# Patient Record
Sex: MALE | Race: WHITE | NOT HISPANIC OR LATINO | Employment: OTHER | URBAN - NONMETROPOLITAN AREA
[De-identification: names, ages, dates, MRNs, and addresses within clinical notes are randomized per-mention and may not be internally consistent; named-entity substitution may affect disease eponyms.]

---

## 2017-08-27 ENCOUNTER — APPOINTMENT (OUTPATIENT)
Dept: CT IMAGING | Facility: HOSPITAL | Age: 57
End: 2017-08-27

## 2017-08-27 ENCOUNTER — APPOINTMENT (OUTPATIENT)
Dept: GENERAL RADIOLOGY | Facility: HOSPITAL | Age: 57
End: 2017-08-27

## 2017-08-27 ENCOUNTER — HOSPITAL ENCOUNTER (EMERGENCY)
Facility: HOSPITAL | Age: 57
Discharge: HOME | End: 2017-08-27
Attending: FAMILY MEDICINE | Admitting: FAMILY MEDICINE

## 2017-08-27 VITALS
HEART RATE: 89 BPM | SYSTOLIC BLOOD PRESSURE: 142 MMHG | HEIGHT: 69 IN | RESPIRATION RATE: 16 BRPM | TEMPERATURE: 98.1 F | DIASTOLIC BLOOD PRESSURE: 92 MMHG | BODY MASS INDEX: 27.55 KG/M2 | WEIGHT: 186 LBS | OXYGEN SATURATION: 99 %

## 2017-08-27 DIAGNOSIS — R55 SYNCOPE, UNSPECIFIED SYNCOPE TYPE: Primary | ICD-10-CM

## 2017-08-27 LAB
A-A DO2: 37.8 MMHG (ref 0–300)
ALBUMIN SERPL-MCNC: 4.6 G/DL (ref 3.5–5)
ALBUMIN/GLOB SERPL: 1.6 G/DL (ref 1.5–2.5)
ALP SERPL-CCNC: 107 U/L (ref 40–129)
ALT SERPL W P-5'-P-CCNC: 59 U/L (ref 10–44)
ANION GAP SERPL CALCULATED.3IONS-SCNC: 7 MMOL/L (ref 3.6–11.2)
APTT PPP: 23.7 SECONDS (ref 23.8–36.1)
ARTERIAL PATENCY WRIST A: POSITIVE
AST SERPL-CCNC: 39 U/L (ref 10–34)
ATMOSPHERIC PRESS: 731 MMHG
BASE EXCESS BLDA CALC-SCNC: -4.6 MMOL/L
BASOPHILS # BLD AUTO: 0.01 10*3/MM3 (ref 0–0.3)
BASOPHILS NFR BLD AUTO: 0.1 % (ref 0–2)
BDY SITE: ABNORMAL
BILIRUB SERPL-MCNC: 0.5 MG/DL (ref 0.2–1.8)
BILIRUB UR QL STRIP: NEGATIVE
BNP SERPL-MCNC: 5 PG/ML (ref 0–100)
BODY TEMPERATURE: 98.6 C
BUN BLD-MCNC: 18 MG/DL (ref 7–21)
BUN/CREAT SERPL: 16.5 (ref 7–25)
CALCIUM SPEC-SCNC: 9.8 MG/DL (ref 7.7–10)
CHLORIDE SERPL-SCNC: 105 MMOL/L (ref 99–112)
CK MB SERPL-CCNC: 0.86 NG/ML (ref 0–5)
CK MB SERPL-RTO: 0.7 % (ref 0–3)
CK SERPL-CCNC: 126 U/L (ref 24–204)
CLARITY UR: CLEAR
CO2 SERPL-SCNC: 26 MMOL/L (ref 24.3–31.9)
COHGB MFR BLD: 1.4 % (ref 0–5)
COLOR UR: YELLOW
CREAT BLD-MCNC: 1.09 MG/DL (ref 0.43–1.29)
CRP SERPL-MCNC: <0.5 MG/DL (ref 0–0.99)
D-LACTATE SERPL-SCNC: 2.1 MMOL/L (ref 0.5–2)
DEPRECATED RDW RBC AUTO: 44.4 FL (ref 37–54)
EOSINOPHIL # BLD AUTO: 0.1 10*3/MM3 (ref 0–0.7)
EOSINOPHIL NFR BLD AUTO: 1.2 % (ref 0–5)
ERYTHROCYTE [DISTWIDTH] IN BLOOD BY AUTOMATED COUNT: 14 % (ref 11.5–14.5)
GFR SERPL CREATININE-BSD FRML MDRD: 69 ML/MIN/1.73
GLOBULIN UR ELPH-MCNC: 2.8 GM/DL
GLUCOSE BLD-MCNC: 129 MG/DL (ref 70–110)
GLUCOSE BLDC GLUCOMTR-MCNC: 106 MG/DL (ref 70–130)
GLUCOSE UR STRIP-MCNC: NEGATIVE MG/DL
HCO3 BLDA-SCNC: 18.2 MMOL/L (ref 22–26)
HCT VFR BLD AUTO: 43.5 % (ref 42–52)
HCT VFR BLD CALC: 44 % (ref 42–52)
HGB BLD-MCNC: 14.6 G/DL (ref 14–18)
HGB BLDA-MCNC: 15 G/DL (ref 12–16)
HGB UR QL STRIP.AUTO: NEGATIVE
HOROWITZ INDEX BLD+IHG-RTO: 21 %
IMM GRANULOCYTES # BLD: 0.01 10*3/MM3 (ref 0–0.03)
IMM GRANULOCYTES NFR BLD: 0.1 % (ref 0–0.5)
INR PPP: 0.94 (ref 0.9–1.1)
KETONES UR QL STRIP: NEGATIVE
LEUKOCYTE ESTERASE UR QL STRIP.AUTO: NEGATIVE
LYMPHOCYTES # BLD AUTO: 1.13 10*3/MM3 (ref 1–3)
LYMPHOCYTES NFR BLD AUTO: 13.4 % (ref 21–51)
MCH RBC QN AUTO: 29.3 PG (ref 27–33)
MCHC RBC AUTO-ENTMCNC: 33.6 G/DL (ref 33–37)
MCV RBC AUTO: 87.3 FL (ref 80–94)
METHGB BLD QL: 0.3 % (ref 0–3)
MODALITY: ABNORMAL
MONOCYTES # BLD AUTO: 0.75 10*3/MM3 (ref 0.1–0.9)
MONOCYTES NFR BLD AUTO: 8.9 % (ref 0–10)
NEUTROPHILS # BLD AUTO: 6.43 10*3/MM3 (ref 1.4–6.5)
NEUTROPHILS NFR BLD AUTO: 76.3 % (ref 30–70)
NITRITE UR QL STRIP: NEGATIVE
OSMOLALITY SERPL CALC.SUM OF ELEC: 279.3 MOSM/KG (ref 273–305)
OXYHGB MFR BLDV: 92.7 % (ref 85–100)
PCO2 BLDA: 28.2 MM HG (ref 35–45)
PH BLDA: 7.43 PH UNITS (ref 7.35–7.45)
PH UR STRIP.AUTO: 6 [PH] (ref 5–8)
PLATELET # BLD AUTO: 193 10*3/MM3 (ref 130–400)
PMV BLD AUTO: 10 FL (ref 6–10)
PO2 BLDA: 72.1 MM HG (ref 80–100)
POTASSIUM BLD-SCNC: 4.1 MMOL/L (ref 3.5–5.3)
PROT SERPL-MCNC: 7.4 G/DL (ref 6–8)
PROT UR QL STRIP: NEGATIVE
PROTHROMBIN TIME: 12.6 SECONDS (ref 11–15.4)
RBC # BLD AUTO: 4.98 10*6/MM3 (ref 4.7–6.1)
SAO2 % BLDCOA: 94.3 % (ref 90–100)
SODIUM BLD-SCNC: 138 MMOL/L (ref 135–153)
SP GR UR STRIP: 1.01 (ref 1–1.03)
TROPONIN I SERPL-MCNC: <0.006 NG/ML
TROPONIN I SERPL-MCNC: <0.006 NG/ML
UROBILINOGEN UR QL STRIP: NORMAL
WBC NRBC COR # BLD: 8.43 10*3/MM3 (ref 4.5–12.5)

## 2017-08-27 PROCEDURE — 84484 ASSAY OF TROPONIN QUANT: CPT | Performed by: FAMILY MEDICINE

## 2017-08-27 PROCEDURE — 85025 COMPLETE CBC W/AUTO DIFF WBC: CPT | Performed by: FAMILY MEDICINE

## 2017-08-27 PROCEDURE — 85730 THROMBOPLASTIN TIME PARTIAL: CPT | Performed by: FAMILY MEDICINE

## 2017-08-27 PROCEDURE — 70450 CT HEAD/BRAIN W/O DYE: CPT | Performed by: RADIOLOGY

## 2017-08-27 PROCEDURE — 82375 ASSAY CARBOXYHB QUANT: CPT | Performed by: FAMILY MEDICINE

## 2017-08-27 PROCEDURE — 83605 ASSAY OF LACTIC ACID: CPT | Performed by: FAMILY MEDICINE

## 2017-08-27 PROCEDURE — 82553 CREATINE MB FRACTION: CPT | Performed by: FAMILY MEDICINE

## 2017-08-27 PROCEDURE — 86140 C-REACTIVE PROTEIN: CPT | Performed by: FAMILY MEDICINE

## 2017-08-27 PROCEDURE — 96360 HYDRATION IV INFUSION INIT: CPT

## 2017-08-27 PROCEDURE — 70450 CT HEAD/BRAIN W/O DYE: CPT

## 2017-08-27 PROCEDURE — 80053 COMPREHEN METABOLIC PANEL: CPT | Performed by: FAMILY MEDICINE

## 2017-08-27 PROCEDURE — 93005 ELECTROCARDIOGRAM TRACING: CPT | Performed by: FAMILY MEDICINE

## 2017-08-27 PROCEDURE — 93010 ELECTROCARDIOGRAM REPORT: CPT | Performed by: INTERNAL MEDICINE

## 2017-08-27 PROCEDURE — 82962 GLUCOSE BLOOD TEST: CPT

## 2017-08-27 PROCEDURE — 99284 EMERGENCY DEPT VISIT MOD MDM: CPT

## 2017-08-27 PROCEDURE — 87040 BLOOD CULTURE FOR BACTERIA: CPT | Performed by: FAMILY MEDICINE

## 2017-08-27 PROCEDURE — 85610 PROTHROMBIN TIME: CPT | Performed by: FAMILY MEDICINE

## 2017-08-27 PROCEDURE — 71010 XR CHEST 1 VW: CPT | Performed by: RADIOLOGY

## 2017-08-27 PROCEDURE — 87086 URINE CULTURE/COLONY COUNT: CPT | Performed by: FAMILY MEDICINE

## 2017-08-27 PROCEDURE — 71010 HC CHEST PA OR AP: CPT

## 2017-08-27 PROCEDURE — 82550 ASSAY OF CK (CPK): CPT | Performed by: FAMILY MEDICINE

## 2017-08-27 PROCEDURE — 81003 URINALYSIS AUTO W/O SCOPE: CPT | Performed by: FAMILY MEDICINE

## 2017-08-27 PROCEDURE — 36600 WITHDRAWAL OF ARTERIAL BLOOD: CPT | Performed by: FAMILY MEDICINE

## 2017-08-27 PROCEDURE — 82805 BLOOD GASES W/O2 SATURATION: CPT | Performed by: FAMILY MEDICINE

## 2017-08-27 PROCEDURE — 83880 ASSAY OF NATRIURETIC PEPTIDE: CPT | Performed by: FAMILY MEDICINE

## 2017-08-27 PROCEDURE — 83050 HGB METHEMOGLOBIN QUAN: CPT | Performed by: FAMILY MEDICINE

## 2017-08-27 RX ORDER — ATORVASTATIN CALCIUM 40 MG/1
40 TABLET, FILM COATED ORAL DAILY
COMMUNITY
End: 2017-10-12

## 2017-08-27 RX ORDER — AMLODIPINE BESYLATE 5 MG/1
5 TABLET ORAL DAILY
COMMUNITY
End: 2017-09-05 | Stop reason: ALTCHOICE

## 2017-08-27 RX ORDER — ASPIRIN 81 MG/1
324 TABLET, CHEWABLE ORAL ONCE
Status: COMPLETED | OUTPATIENT
Start: 2017-08-27 | End: 2017-08-27

## 2017-08-27 RX ORDER — SODIUM CHLORIDE 0.9 % (FLUSH) 0.9 %
10 SYRINGE (ML) INJECTION AS NEEDED
Status: DISCONTINUED | OUTPATIENT
Start: 2017-08-27 | End: 2017-08-27 | Stop reason: HOSPADM

## 2017-08-27 RX ORDER — MELOXICAM 15 MG/1
15 TABLET ORAL DAILY
COMMUNITY

## 2017-08-27 RX ADMIN — ASPIRIN 324 MG: 81 TABLET, CHEWABLE ORAL at 11:40

## 2017-08-27 RX ADMIN — SODIUM CHLORIDE 1000 ML: 9 INJECTION, SOLUTION INTRAVENOUS at 11:46

## 2017-08-27 NOTE — ED NOTES
Patient sleeping on stretcher at this time; NAD noted; no complaints; will continue to monitor patient for any changes.     Carly Almodovar RN  08/27/17 9709

## 2017-08-27 NOTE — ED NOTES
No changes in patient status; NAD noted; will continue to monitor patient for any changes. Neuro's WNL.     Carly Almodovar RN  08/27/17 2320

## 2017-08-27 NOTE — ED NOTES
Patient reports syncopal episode while at [employer]; [employer] member reports that patient passed out and was unresponsive and became gray in color and diaphoretic and stiffened up; Patient alert and oriented x 4; reports that he doesn't remember anything that has happened; skin now pink, warm and dry; denies any chest pain or discomfort, denies any shortness of breath, denies any n/v. Patient does report that he has had diarrhea x 3-4 times this am.       Carly Almodovar RN  08/27/17 1310

## 2017-08-27 NOTE — ED PROVIDER NOTES
Subjective   Patient is a 57 y.o. male presenting with syncope.   History provided by:  Patient  Syncope   Episode history:  Single  Most recent episode:  Today  Timing:  Constant  Progression:  Resolved  Chronicity:  New  Context: normal activity and standing up    Witnessed: yes    Relieved by:  Nothing  Worsened by:  Nothing  Associated symptoms: diaphoresis    Associated symptoms: no anxiety, no chest pain, no confusion, no dizziness, no headaches, no nausea, no shortness of breath, no vomiting and no weakness    Associated symptoms comment:  Diarrhea this am and then after the event      Review of Systems   Constitutional: Positive for diaphoresis. Negative for activity change, appetite change, chills and fatigue.   HENT: Negative for congestion.    Eyes: Negative for pain.   Respiratory: Negative for cough, shortness of breath, wheezing and stridor.    Cardiovascular: Positive for syncope. Negative for chest pain.   Gastrointestinal: Negative for abdominal pain, diarrhea, nausea and vomiting.   Genitourinary: Negative for dysuria.   Musculoskeletal: Negative for arthralgias, myalgias, neck pain and neck stiffness.   Skin: Negative for rash.   Neurological: Negative for dizziness, syncope, speech difficulty, weakness and headaches.   Psychiatric/Behavioral: Negative for agitation and confusion.       Past Medical History:   Diagnosis Date   • Arthritis    • Hyperlipidemia    • Hypertension        Allergies   Allergen Reactions   • Lisinopril    • Sulfa Antibiotics        History reviewed. No pertinent surgical history.    History reviewed. No pertinent family history.    Social History     Social History   • Marital status:      Spouse name: N/A   • Number of children: N/A   • Years of education: N/A     Social History Main Topics   • Smoking status: Former Smoker     Quit date: 1987   • Smokeless tobacco: None   • Alcohol use No   • Drug use: No   • Sexual activity: Defer     Other Topics Concern   •  None     Social History Narrative   • None           Objective   Physical Exam   Constitutional: He is oriented to person, place, and time. He appears well-developed and well-nourished. No distress.   HENT:   Head: Normocephalic and atraumatic.   Right Ear: External ear normal.   Left Ear: External ear normal.   Nose: Nose normal.   Mouth/Throat: Oropharynx is clear and moist.   Eyes: EOM are normal. Pupils are equal, round, and reactive to light. Right eye exhibits no discharge. Left eye exhibits no discharge.   Neck: Neck supple. No JVD present. No tracheal deviation present. No thyromegaly present.   Pulmonary/Chest: Effort normal and breath sounds normal. No stridor. No respiratory distress.   Abdominal: Soft. Bowel sounds are normal. He exhibits no distension. There is no tenderness. There is no rebound and no guarding.   Musculoskeletal: Normal range of motion. He exhibits no edema, tenderness or deformity.   Neurological: He is alert and oriented to person, place, and time. He displays normal reflexes. No cranial nerve deficit. He exhibits normal muscle tone. Coordination normal.   Skin: Skin is warm. He is not diaphoretic.   Psychiatric: He has a normal mood and affect. His behavior is normal. Judgment and thought content normal.   Nursing note and vitals reviewed.      Procedures         ED Course  ED Course   Value Comment By Time    CT head- NOrmal brain/head CT per [facility] Melody Hanley, DO 08/27 1212   ECG 12 Lead EKG interpretation at 1141 normal sinus rhythm 74 bpm no evidence of acute ischemic change QRS duration is 106 QTc is 374 QTc is 415 Melody Hanley, DO 08/27 1215    Dustin called from lab to report to me a lactic acid of 2.1 Melody Hanley, DO 08/27 1224                  MDM  Number of Diagnoses or Management Options  Syncope, unspecified syncope type: new and requires workup     Amount and/or Complexity of Data Reviewed  Clinical lab tests: ordered and reviewed  Tests in  the radiology section of CPT®: ordered and reviewed  Tests in the medicine section of CPT®: reviewed and ordered  Independent visualization of images, tracings, or specimens: yes    Risk of Complications, Morbidity, and/or Mortality  Presenting problems: moderate  Diagnostic procedures: moderate  Management options: moderate    Patient Progress  Patient progress: improved      Final diagnoses:   Syncope, unspecified syncope type            Melody Hanley DO  08/28/17 0654

## 2017-08-29 LAB — BACTERIA SPEC AEROBE CULT: NO GROWTH

## 2017-09-01 LAB
BACTERIA SPEC AEROBE CULT: NORMAL
BACTERIA SPEC AEROBE CULT: NORMAL

## 2017-09-05 ENCOUNTER — OFFICE VISIT (OUTPATIENT)
Dept: CARDIOLOGY | Facility: CLINIC | Age: 57
End: 2017-09-05

## 2017-09-05 VITALS
BODY MASS INDEX: 27.67 KG/M2 | DIASTOLIC BLOOD PRESSURE: 93 MMHG | OXYGEN SATURATION: 97 % | SYSTOLIC BLOOD PRESSURE: 151 MMHG | HEART RATE: 81 BPM | WEIGHT: 186.8 LBS | HEIGHT: 69 IN

## 2017-09-05 DIAGNOSIS — I10 ESSENTIAL HYPERTENSION: ICD-10-CM

## 2017-09-05 DIAGNOSIS — R55 SYNCOPE, UNSPECIFIED SYNCOPE TYPE: ICD-10-CM

## 2017-09-05 DIAGNOSIS — R06.02 SHORTNESS OF BREATH: Primary | ICD-10-CM

## 2017-09-05 PROBLEM — R07.9 CHEST PAIN: Status: ACTIVE | Noted: 2017-09-05

## 2017-09-05 PROCEDURE — 99204 OFFICE O/P NEW MOD 45 MIN: CPT | Performed by: INTERNAL MEDICINE

## 2017-09-05 RX ORDER — ATENOLOL 25 MG/1
25 TABLET ORAL DAILY
Qty: 30 TABLET | Refills: 11 | Status: SHIPPED | OUTPATIENT
Start: 2017-09-05 | End: 2017-09-25 | Stop reason: SINTOL

## 2017-09-05 NOTE — PATIENT INSTRUCTIONS
Monitor heart rate and blood pressure when convenient for you or with symptoms. Keep a lod of readings and bring to next apt. Call our office and ask for Lizzie with questions or concerns.

## 2017-09-05 NOTE — PROGRESS NOTES
"Subjective   Ric Wilson is a 57 y.o. male     Chief Complaint   Patient presents with   • Loss of Consciousness     patient presents as a new patient       PROBLEM LIST:     Specialty Problems     None            HPI:    Patient is a 57-year-old male that presents because of a syncopal episode.  Patient states that he was at [employer] in a prayer  cervical.  Patient felt as if he needed to relax his knees before having a syncopal episode.  He described having this episode while standing.  According to personnel around him he was \"stiff\".  After 2-3 minutes he regained consciousness.  He had no loss of bowel or bladder function.  He had no chest pain, dyspnea or palpitations.  This is only one isolated event of syncope.  He has had no symptoms since.  He is active doing carpentry work.  He has only very minimal dyspnea when exerting for high levels of activity.  He has no chest pain or pressure.  He denies PND orthopnea.  He doesn't palpitate or have dysrhythmic symptoms otherwise      CURRENT MEDICATION:    Current Outpatient Prescriptions   Medication Sig Dispense Refill   • amLODIPine (NORVASC) 5 MG tablet Take 5 mg by mouth Daily.     • atorvastatin (LIPITOR) 40 MG tablet Take 40 mg by mouth Daily.     • meloxicam (MOBIC) 15 MG tablet Take 15 mg by mouth Daily.       No current facility-administered medications for this visit.        ALLERGIES:    Lisinopril and Sulfa antibiotics    PAST MEDICAL HISTORY:    Past Medical History:   Diagnosis Date   • Arthritis    • Hyperlipidemia    • Hypertension        SURGICAL HISTORY:    History reviewed. No pertinent surgical history.    SOCIAL HISTORY:    Social History     Social History   • Marital status:      Spouse name: N/A   • Number of children: N/A   • Years of education: N/A     Occupational History   • Not on file.     Social History Main Topics   • Smoking status: Former Smoker     Quit date: 1987   • Smokeless tobacco: Not on file   • Alcohol use No   • " "Drug use: No   • Sexual activity: Defer     Other Topics Concern   • Not on file     Social History Narrative       FAMILY HISTORY:    Family History   Problem Relation Age of Onset   • No Known Problems Mother    • No Known Problems Father        Review of Systems   Constitutional: Negative.  Negative for chills, diaphoresis, fatigue and fever.   HENT: Negative.    Eyes: Positive for visual disturbance (wears glasses).   Respiratory: Positive for shortness of breath (with over exertion, no orthopnea or PND). Negative for cough, choking, chest tightness, wheezing and stridor.    Cardiovascular: Negative.  Negative for chest pain, palpitations and leg swelling.   Gastrointestinal: Negative.  Negative for abdominal pain, blood in stool (no melena, no hematuria, no hematemesis, no hematochezia), constipation, diarrhea, nausea and vomiting.   Endocrine: Positive for heat intolerance. Negative for cold intolerance.   Genitourinary: Negative.    Musculoskeletal: Negative.  Negative for myalgias.   Skin: Negative.    Allergic/Immunologic: Negative.    Neurological: Positive for dizziness and syncope (1 episode 1 weeks ago while standing 15-20 minutes in prayer group , developed near syncope, has had other episode since then ). Negative for tremors, seizures, facial asymmetry (no stroke like symptoms), speech difficulty, weakness, light-headedness, numbness and headaches.   Hematological: Negative.    Psychiatric/Behavioral: Negative.        VISIT VITALS:    /93 (BP Location: Right arm, Patient Position: Standing)  Pulse 81  Ht 69\" (175.3 cm)  Wt 186 lb 12.8 oz (84.7 kg)  SpO2 97%  BMI 27.59 kg/m2    RECENT LABS:    Objective       Physical Exam   Constitutional: He is oriented to person, place, and time. He appears well-developed and well-nourished. No distress.   HENT:   Head: Normocephalic and atraumatic.   Eyes: EOM and lids are normal. Pupils are equal, round, and reactive to light. Lids are everted and " swept, no foreign bodies found.   Neck: Normal range of motion. Neck supple. Normal carotid pulses, no hepatojugular reflux and no JVD present. Carotid bruit is not present (normal carotid upstrokes). No thyroid mass and no thyromegaly present.   Cardiovascular: Normal rate, regular rhythm, S1 normal, S2 normal, normal heart sounds, intact distal pulses and normal pulses.  Exam reveals no gallop, no friction rub and no decreased pulses.    No murmur heard.  Pulses:       Radial pulses are 2+ on the right side, and 2+ on the left side.        Dorsalis pedis pulses are 2+ on the right side, and 2+ on the left side.        Posterior tibial pulses are 2+ on the right side, and 2+ on the left side.   Marked sinus arhythmia    Pulmonary/Chest: Effort normal and breath sounds normal. No respiratory distress. He has no wheezes. He has no rales. He exhibits no tenderness.   Abdominal: Soft. Bowel sounds are normal. He exhibits no distension, no abdominal bruit and no mass. There is no tenderness.   Negative organomegaly      Musculoskeletal: Normal range of motion. He exhibits no edema, tenderness or deformity.   Lymphadenopathy:     He has no cervical adenopathy.     He has no axillary adenopathy.   Neurological: He is alert and oriented to person, place, and time. No cranial nerve deficit.   Skin: Skin is warm and dry. No rash noted. No erythema. No pallor.   Psychiatric: He has a normal mood and affect. His speech is normal and behavior is normal. Judgment and thought content normal. Cognition and memory are normal.   Nursing note and vitals reviewed.      Procedures      Assessment/Plan      Diagnosis Plan   1. Shortness of breath     2. Syncope, unspecified syncope type     3. Essential hypertension         No Follow-up on file.    Recommendation  1.  Because of patient's symptoms, it appears atypical.  It may likely be neurocardiogenic.  He feels he may have locked his knees prior to this event which may precipitated  his neuro cardiogenic syncope.  However, we'll schedule for 2 week event monitor.  Echocardiogram to evaluate LV function.  Otherwise we'll see him back for follow-up of above testing.  Follow-up primary as scheduled    Dictation done for Dr. Funes who was seen and evaluated the patient.  History physical exam and assessment are all for him       LIVE Rios

## 2017-09-25 ENCOUNTER — TELEPHONE (OUTPATIENT)
Dept: CARDIOLOGY | Facility: CLINIC | Age: 57
End: 2017-09-25

## 2017-09-25 RX ORDER — METOPROLOL SUCCINATE 25 MG/1
25 TABLET, EXTENDED RELEASE ORAL DAILY
Qty: 30 TABLET | Refills: 11 | Status: SHIPPED | OUTPATIENT
Start: 2017-09-25

## 2017-09-25 NOTE — TELEPHONE ENCOUNTER
patient chart reviewed by MARIANA Tolliver. Patient to stop atenolol and start metoprolol succinate 25 mg daily. patient verbalize understanding and is to call our office with questions or concerns.     ----- Message from Gloria Varghese sent at 9/25/2017 10:33 AM EDT -----  Regarding: TRIAGE   Contact: [phone]  THE PATIENT CALLED AND STATES HE THINKS HE IS HAVING AN ALLERGIC REACTION TO HIS ATENOLOL.  HE STATES HE HAS STARTED ITCHING FOR ABOUT A WEEK NOW AND THAT IS THE ONLY NEW MEDICATION HE HAS STARTED.  HE CAN BE REACHED [phone].  THANKS

## 2017-10-12 ENCOUNTER — OFFICE VISIT (OUTPATIENT)
Dept: CARDIOLOGY | Facility: CLINIC | Age: 57
End: 2017-10-12

## 2017-10-12 VITALS
DIASTOLIC BLOOD PRESSURE: 88 MMHG | SYSTOLIC BLOOD PRESSURE: 152 MMHG | HEART RATE: 61 BPM | OXYGEN SATURATION: 98 % | HEIGHT: 69 IN | BODY MASS INDEX: 28.56 KG/M2 | WEIGHT: 192.8 LBS

## 2017-10-12 DIAGNOSIS — I10 ESSENTIAL HYPERTENSION: Primary | ICD-10-CM

## 2017-10-12 DIAGNOSIS — E78.5 HYPERLIPIDEMIA, UNSPECIFIED HYPERLIPIDEMIA TYPE: ICD-10-CM

## 2017-10-12 PROCEDURE — 99213 OFFICE O/P EST LOW 20 MIN: CPT | Performed by: NURSE PRACTITIONER

## 2017-10-12 RX ORDER — CETIRIZINE HYDROCHLORIDE 10 MG/1
10 TABLET ORAL DAILY
Refills: 0 | COMMUNITY
Start: 2017-07-25

## 2017-10-12 NOTE — PATIENT INSTRUCTIONS

## 2017-10-12 NOTE — PROGRESS NOTES
Subjective   Ric Wilson is a 57 y.o. male     Chief Complaint   Patient presents with   • Follow-up     patient appears in office today for 1 month follow up ; pt denies any CP /syncopal episodes/palpitations at this time   • Hypertension     patient states B/P has been WNL at home with current medications    • Shortness of Breath     patient denies being SOA even with exertion        HPI    Problem list:    1.  Hypertension  2.  Hyperlipidemia    Patient is a 57-year-old male who presents today for follow-up.  He denies any chest pain, pressure, palpitations, fluttering, dizziness, presyncope, CP, orthopnea, PND or edema.  He denies any shortness of breath with activity.  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  He says he never worse monitor as he never had any other episodes and he is felt fine.  PCP monitors cholesterol.  Patient says his blood pressure is always slightly elevated when he comes to the office however at home it runs very good.    Current Outpatient Prescriptions   Medication Sig Dispense Refill   • cetirizine (zyrTEC) 10 MG tablet Take 10 mg by mouth Daily.  0   • meloxicam (MOBIC) 15 MG tablet Take 15 mg by mouth Daily.     • metoprolol succinate XL (TOPROL-XL) 25 MG 24 hr tablet Take 1 tablet by mouth Daily. 30 tablet 11     No current facility-administered medications for this visit.        ALLERGIES    Lisinopril and Sulfa antibiotics    Past Medical History:   Diagnosis Date   • Arthritis    • Hyperlipidemia    • Hypertension        Social History     Social History   • Marital status:      Spouse name: N/A   • Number of children: N/A   • Years of education: N/A     Occupational History   • Not on file.     Social History Main Topics   • Smoking status: Former Smoker     Quit date: 1987   • Smokeless tobacco: Never Used   • Alcohol use No   • Drug use: No   • Sexual activity: Defer     Other Topics Concern   • Not on file     Social  "History Narrative       Family History   Problem Relation Age of Onset   • No Known Problems Mother    • No Known Problems Father        Review of Systems   Constitutional: Negative for diaphoresis and fatigue.   HENT: Negative for congestion, sinus pain, sinus pressure and sneezing.    Eyes: Positive for visual disturbance (wears glasses daily).   Respiratory: Negative for cough, chest tightness, shortness of breath (denies SOA) and wheezing.    Cardiovascular: Negative for chest pain (denies CP), palpitations (denies palpitations/flutters) and leg swelling.   Gastrointestinal: Negative for abdominal pain, nausea and vomiting.   Endocrine: Negative for cold intolerance, heat intolerance, polyphagia and polyuria.   Genitourinary: Negative for difficulty urinating, frequency and urgency.   Musculoskeletal: Positive for arthralgias (knees). Negative for back pain, myalgias, neck pain and neck stiffness.   Skin: Negative.  Negative for rash and wound.   Allergic/Immunologic: Negative for environmental allergies and food allergies.   Neurological: Negative for dizziness, weakness, light-headedness and headaches.   Hematological: Does not bruise/bleed easily.   Psychiatric/Behavioral: Negative for agitation, confusion and sleep disturbance (denies waking up smothering/SOA). The patient is not nervous/anxious.        Objective   /88 (BP Location: Left arm, Patient Position: Sitting)  Pulse 61  Ht 69\" (175.3 cm)  Wt 192 lb 12.8 oz (87.5 kg)  SpO2 98%  BMI 28.47 kg/m2  Vitals:    10/12/17 0815   BP: 152/88   BP Location: Left arm   Patient Position: Sitting   Pulse: 61   SpO2: 98%   Weight: 192 lb 12.8 oz (87.5 kg)   Height: 69\" (175.3 cm)      Lab Results (most recent)     None        Physical Exam   Constitutional: He is oriented to person, place, and time. Vital signs are normal. He appears well-developed and well-nourished. He is active and cooperative.   HENT:   Head: Normocephalic.   Eyes: Lids are normal. "   Wears glasses    Neck: Normal carotid pulses, no hepatojugular reflux and no JVD present. Carotid bruit is not present.   Cardiovascular: Normal rate, regular rhythm and normal heart sounds.    Pulses:       Radial pulses are 2+ on the right side, and 2+ on the left side.        Dorsalis pedis pulses are 2+ on the right side, and 2+ on the left side.        Posterior tibial pulses are 2+ on the right side, and 2+ on the left side.   No edema BLE.    Pulmonary/Chest: Effort normal and breath sounds normal.   Abdominal: Normal appearance and bowel sounds are normal.   Neurological: He is alert and oriented to person, place, and time.   Skin: Skin is warm, dry and intact.   Psychiatric: He has a normal mood and affect. His speech is normal and behavior is normal. Judgment and thought content normal. Cognition and memory are normal.       Procedure   Procedures         Assessment/Plan      Diagnosis Plan   1. Essential hypertension     2. Hyperlipidemia, unspecified hyperlipidemia type         Return in about 1 year (around 10/12/2018).  Hypertension-patient doing well.  Hyperlipidemia-patient is nontoxic control.  He will continue his medication regimen.  He will follow-up in one year or sooner if any changes.

## 2021-02-25 DIAGNOSIS — Z23 IMMUNIZATION DUE: ICD-10-CM
